# Patient Record
Sex: FEMALE | Race: WHITE | NOT HISPANIC OR LATINO | Employment: FULL TIME | URBAN - METROPOLITAN AREA
[De-identification: names, ages, dates, MRNs, and addresses within clinical notes are randomized per-mention and may not be internally consistent; named-entity substitution may affect disease eponyms.]

---

## 2021-11-21 ENCOUNTER — APPOINTMENT (OUTPATIENT)
Dept: RADIOLOGY | Facility: IMAGING CENTER | Age: 56
End: 2021-11-21
Attending: NURSE PRACTITIONER
Payer: COMMERCIAL

## 2021-11-21 ENCOUNTER — OCCUPATIONAL MEDICINE (OUTPATIENT)
Dept: URGENT CARE | Facility: CLINIC | Age: 56
End: 2021-11-21
Payer: COMMERCIAL

## 2021-11-21 VITALS
WEIGHT: 176 LBS | SYSTOLIC BLOOD PRESSURE: 110 MMHG | OXYGEN SATURATION: 97 % | TEMPERATURE: 97.3 F | RESPIRATION RATE: 16 BRPM | HEIGHT: 65 IN | HEART RATE: 69 BPM | BODY MASS INDEX: 29.32 KG/M2 | DIASTOLIC BLOOD PRESSURE: 80 MMHG

## 2021-11-21 DIAGNOSIS — S80.02XA CONTUSION OF LEFT KNEE, INITIAL ENCOUNTER: ICD-10-CM

## 2021-11-21 DIAGNOSIS — S89.92XA INJURY OF LEFT KNEE, INITIAL ENCOUNTER: ICD-10-CM

## 2021-11-21 DIAGNOSIS — S76.012A HIP STRAIN, LEFT, INITIAL ENCOUNTER: ICD-10-CM

## 2021-11-21 PROCEDURE — 99203 OFFICE O/P NEW LOW 30 MIN: CPT | Performed by: NURSE PRACTITIONER

## 2021-11-21 PROCEDURE — 73564 X-RAY EXAM KNEE 4 OR MORE: CPT | Mod: TC,LT | Performed by: NURSE PRACTITIONER

## 2021-11-21 PROCEDURE — 73501 X-RAY EXAM HIP UNI 1 VIEW: CPT | Mod: TC,LT | Performed by: NURSE PRACTITIONER

## 2021-11-21 NOTE — LETTER
[facility] [address] [place] [address]  Phone:  [phone] - Fax:  [phone]   [facility] Progress Report and Disability Certification  Date of Service: 11/21/2021   No Show:  No  Date / Time of Next Visit: 11/29/2021   Claim Information   Patient Name: Hanny Fitzpatrick  Claim Number:     Employer: [employer]  Date of Injury: 11/21/2021     Insurer / TPA: [employer]  ID / SSN:     Occupation:   Diagnosis: Diagnoses of Injury of left knee, initial encounter, Hip strain, left, initial encounter, and Contusion of left knee, initial encounter were pertinent to this visit.    Medical Information   Related to Industrial Injury? 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  States she was turning to her left when her right foot caught an air hose causing her to fall to the ground.  Patient fell directly onto her left knee and landed on her left hip.  She did put her hands out to brace her fall however has no wrist pain.  She complains of left knee pain with certain movements and palpation.  Has difficulty ambulating due to the pain.  She has taken Tylenol and Aleve with mild relief in her symptoms.  She denies any numbness or tingling in bilateral lower extremities.  She denies previous injury to the left knee and/or left hip.  She denies second job.   Objective Findings: Left knee: No gross deformities, skin without erythema, mild edema noted, exquisitely tender to palpation to the patella. +AROM with pain.  Gait antalgic.  DTRs +2.  Left hip: No gross deformities, no bony tenderness, muscular soreness noted.+AROM .  Right wrist: No gross deformities, nontender to palpation, no erythema, no edema, no ecchymosis +AROM  Left wrist: No gross deformities, nontender to palpation, no erythema,  no edema, no ecchymosis, +AROM.       Pre-Existing Condition(s):     Assessment:   Initial Visit    Status: Additional Care Required  Permanent Disability:No    Plan:      Diagnostics: X-ray    Comments:    I independently reviewed the patient's imaging and agree with the interpretation of the radiologist.      No radiographic evidence of acute traumatic injury.     If there is concern for occult fracture, cross-sectional imaging can be obtained.  No   radiographic evidence of acute traumatic injury.    Disability Information   Status: Released to Restricted Duty    From:  2021  Through: 2021 Restrictions are: Temporary   Physical Restrictions   Sitting:    Standing:  < or = to 2 hrs/day Stooping:    Bendin hrs/day   Squattin hrs/day Walkin hrs/day Climbin hrs/day Pushing:      Pulling:    Other:    Reaching Above Shoulder (L):   Reaching Above Shoulder (R):       Reaching Below Shoulder (L):    Reaching Below Shoulder (R):      Not to exceed Weight Limits   Carrying(hrs):   Weight Limit(lb): < or = to 10 pounds Lifting(hrs):   Weight  Limit(lb): < or = to 10 pounds   Comments: X-ray negative for any fracture or dislocations.  No concerns of occult fracture of the left hip.  Patient provided a left knee brace and crutches advised weightbearing as tolerated.  Encouraged to rest, ice, may take Tylenol ibuprofen as needed for pain.  Will place on work restrictions.  Follow-up in 1 week for reevaluation    Repetitive Actions   Hands: i.e. Fine Manipulations from Grasping:     Feet: i.e. Operating Foot Controls:     Driving / Operate Machinery:     Health Care Provider’s Original or Electronic Signature  Deejay aHmm A.P.R.NPalmer Health Care Provider’s Original or Electronic Signature    Robbie Dawson MD         Clinic Name / Location: [address] [place] [address] Clinic Phone Number: Dept: [phone]   Appointment Time: 6:15 Pm Visit Start Time:  7:29 PM   Check-In Time:  6:16 Pm Visit Discharge Time:     Original-Treating Physician or Chiropractor    Page 2-Insurer/TPA    Page 3-Employer    Page 4-Employee

## 2021-11-21 NOTE — LETTER
"EMPLOYEE’S CLAIM FOR COMPENSATION/ REPORT OF INITIAL TREATMENT  FORM C-4    EMPLOYEE’S CLAIM - PROVIDE ALL INFORMATION REQUESTED   First Name  Hanny Last Name  Amari Birthdate                    1965                Sex  female Claim Number (Insurer’s Use Only)    Home Address  [address] Age  56 y.o. Height  1.638 m (5' 4.5\") Weight  79.8 kg (176 lb) [facility]     [facility] Zip  [address] Telephone  [phone] (home)    Mailing Address  [address] [facility] Zip  [address] Primary Language Spoken  English    Insurer   Third-Party   [employer]   Employee's Occupation (Job Title) When Injury or Occupational Disease Occurred      Employer's Name/Company Name  [employer]  Telephone  [phone]    Office Mail Address (Number and Street)   [address]  [facility]  Zip  [address]    Date of Injury  11/21/2021               Hours Injury  4:15 PM Date Employer Notified  11/21/2021 Last Day of Work after Injury     or Occupational Disease  11/21/2021 Supervisor to Whom Injury     Reported  Doc Schofield   Address or Location of Accident (if applicable)  Work [1]   What were you doing at the time of accident? 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 (Be specific an answer in detail. Use additional sheet if necessary)  Turned to walk away, right foot caught in airhose, body landed on left knee/hip   If you believe that you have an occupational disease, when did you first have knowledge of the disability and it relationship to your employment?  n/a Witnesses to the Accident  Zak Bacon      Nature of Injury or Occupational Disease  Workers' Compensation  Part(s) of Body Injured or Affected  Lower Leg (L), Knee (L), Hip (L)    I certify that the above is " true and correct to the best of my knowledge and that I have provided this information in order to obtain the benefits of [place]  I hereby authorize any physician, chiropractor, surgeon, practitioner, or other person, any hospital, including [facility], any medical service organization, any insurance company, or other institution or organization to release to each other, any medical or other information, including benefits paid or payable, pertinent to this injury or disease, except information relative to diagnosis, treatment and/or counseling for AIDS, psychological conditions, alcohol or controlled substances, for which I must give specific authorization.  A Photostat of this authorization shall be as valid as the original.     Date   Place Employee’s Original or  *Electronic Signature   THIS REPORT MUST BE COMPLETED AND MAILED WITHIN 3 WORKING DAYS OF TREATMENT   Place  [facility]  Name of Facility  [facility]   Date  11/21/2021 Diagnosis and Description of Injury or Occupational Disease  (S89.92XA) Injury of left knee, initial encounter  (S76.012A) Hip strain, left, initial encounter  (S80.02XA) Contusion of left knee, initial encounter Is there evidence the injured employee was under the influence of alcohol and/or another controlled substance at the time of accident?  ? No ? Yes (if yes, please explain)    Hour  7:29 PM   Diagnoses of Injury of left knee, initial encounter, Hip strain, left, initial encounter, and Contusion of left knee, initial encounter were pertinent to this visit. No   Treatment  X-ray negative for any fracture or dislocations.  No concerns of occult fracture of the left hip.  Patient provided a left knee brace and crutches advised weightbearing as tolerated.  Encouraged to rest, ice, may take Tylenol ibuprofen as needed for pain.  Will  place on work restrictions.  Follow-up in 1 week for reevaluation  Have you advised the patient to remain off work five days or     more?    X-Ray Findings  Negative   ? Yes Indicate dates:   From   To      From information given by the employee, together with medical evidence, can        you directly connect this injury or occupational disease as job incurred?  Yes ? No If no, is the injured employee capable of:  ? full duty  No ? modified duty  Yes   Is additional medical care by a physician indicated?  Yes If Modified Duty, Specify any Limitations / Restrictions  Limited standing, walking, no bending, climbing, stooping, weight restrictions less than 10 pounds   Do you know of any previous injury or disease contributing to this condition or occupational disease?  ? Yes ? No (Explain if yes)                          No   Date  11/21/2021 Print Health Care Provider's   HARRIS Hardy I certify the employer’s copy of  this form was mailed on:   Address  [address] [place] [address] Insurer’s Use Only     [facility]  [address]    Provider’s Tax ID Number  [id number] Telephone  Dept: [phone]             Health Care Provider’s Original or Electronic Signature  e-RAKAN Ibarra.RPalmerNPalmer Degree (MD,DO, DC,PA-C,APRN)         * Complete and attach Release of Information (Form C-4A) when injured employee signs C-4 Form electronically  ORIGINAL - TREATING HEALTHCARE PROVIDER PAGE 2 - INSURER/TPA PAGE 3 - EMPLOYER PAGE 4 - EMPLOYEE             Form C-4 (rev.08/21)           BRIEF DESCRIPTION OF RIGHTS AND BENEFITS  (Pursuant to [place] 616C.050)    Notice of Injury or Occupational Disease (Incident Report Form C-1): If an injury or occupational disease (OD) arises out of and in the course of employment, you must provide written notice to your employer as soon as practicable, but no later than 7 days after the accident or OD. Your employer shall maintain a sufficient supply of the required  "forms.    Claim for Compensation (Form C-4): If medical treatment is sought, the form C-4 is available at the place of initial treatment. A completed \"Claim for Compensation\" (Form C-4) must be filed within 90 days after an accident or OD. The treating physician or chiropractor must, within 3 working days after treatment, complete and mail to the employer, the employer's insurer and third-party , the Claim for Compensation.    Medical Treatment: If you require medical treatment for your on-the-job injury or OD, you may be required to select a physician or chiropractor from a list provided by your workers’ compensation insurer, if it has contracted with an Organization for Managed Care (MCO) or Preferred Provider Organization (PPO) or providers of health care. If your employer has not entered into a contract with an MCO or PPO, you may select a physician or chiropractor from the Panel of Physicians and Chiropractors. Any medical costs related to your industrial injury or OD will be paid by your insurer.    Temporary Total Disability (TTD): If your doctor has certified that you are unable to work for a period of at least 5 consecutive days, or 5 cumulative days in a 20-day period, or places restrictions on you that your employer does not accommodate, you may be entitled to TTD compensation.    Temporary Partial Disability (TPD): If the wage you receive upon reemployment is less than the compensation for TTD to which you are entitled, the insurer may be required to pay you TPD compensation to make up the difference. TPD can only be paid for a maximum of 24 months.    Permanent Partial Disability (PPD): When your medical condition is stable and there is an indication of a PPD as a result of your injury or OD, within 30 days, your insurer must arrange for an evaluation by a rating physician or chiropractor to determine the degree of your PPD. The amount of your PPD award depends on the date of injury, the " results of the PPD evaluation, your age and wage.    Permanent Total Disability (PTD): If you are medically certified by a treating physician or chiropractor as permanently and totally disabled and have been granted a PTD status by your insurer, you are entitled to receive monthly benefits not to exceed 66 2/3% of your average monthly wage. The amount of your PTD payments is subject to reduction if you previously received a lump-sum PPD award.    Vocational Rehabilitation Services: You may be eligible for vocational rehabilitation services if you are unable to return to the job due to a permanent physical impairment or permanent restrictions as a result of your injury or occupational disease.    Transportation and Per Reyna Reimbursement: You may be eligible for travel expenses and per reyna associated with medical treatment.    Reopening: You may be able to reopen your claim if your condition worsens after claim closure.     Appeal Process: If you disagree with a written determination issued by the insurer or the insurer does not respond to your request, you may appeal to the Department of Administration, , by following the instructions contained in your determination letter. You must appeal the determination within 70 days from the date of the determination letter at [address], [address], [place], [place] [address], or [address], [place] [address]. If you disagree with the  decision, you may appeal to the Department of Administration, . You must file your appeal within 30 days from the date of the  decision letter at [address], [address], [place], [place] [address], or [address], [place], [place] [address]. If you disagree with a decision of an , you may file a petition for judicial review with the District Court. You must do so within 30 days of the Appeal Officer’s  decision. You may be represented by an  at your own expense or you may contact the [facility] for possible representation.    [place]  for Injured Workers (NAIW): If you disagree with a  decision, you may request that NAIW represent you without charge at an  Hearing. For information regarding denial of benefits, you may contact the [facility] at: [address], [address], [place] [address], [phone], or [address], [address], [place] [address], [phone]    To File a Complaint with the Division: If you wish to file a complaint with the  of the Division of Industrial Relations (DIR),  please contact the Workers’ Compensation Section, [address], [address], [place], [place] [address], telephone [phone], or [address], [place], [place] [address], telephone [phone].    For assistance with Workers’ Compensation Issues: You may contact the [facility] Office for Consumer Health Assistance, [address], [place], [place] [address], Toll Free [phone], Web site: http://[facility].[place].gov/Programs/ELIE E-mail: [email or web address]              __________________________________________________________________                                    _________________            Employee Name / Signature                                                                                                                            Date                                                                                                                                                                                                                              D-2 (rev. 10/20)

## 2021-11-22 RX ORDER — LEVOTHYROXINE SODIUM 0.12 MG/1
125 TABLET ORAL
COMMUNITY
Start: 2021-10-14

## 2021-11-22 RX ORDER — ESTRADIOL 1 MG/1
1 TABLET ORAL
COMMUNITY
Start: 2021-10-14

## 2021-11-22 RX ORDER — LEVOTHYROXINE SODIUM 112 UG/1
112 TABLET ORAL
COMMUNITY
Start: 2021-09-17

## 2021-11-22 RX ORDER — PROGESTERONE 100 MG/1
CAPSULE ORAL
COMMUNITY
Start: 2021-10-16

## 2021-11-22 ASSESSMENT — ENCOUNTER SYMPTOMS
VOMITING: 0
EYE PAIN: 0
NAUSEA: 0
CHILLS: 0
FEVER: 0
SORE THROAT: 0
MYALGIAS: 0
DIZZINESS: 0
SHORTNESS OF BREATH: 0
FALLS: 1

## 2021-11-22 NOTE — PROGRESS NOTES
"Subjective:   Hanny Fitzpatrick  is a 56 y.o. 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  States she was turning to her left when her right foot caught an air hose causing her to fall to the ground.  Patient fell directly onto her left knee and landed on her left hip.  She did put her hands out to brace her fall however has no wrist pain.  She complains of left knee pain with certain movements and palpation.  Has difficulty ambulating due to the pain.  She has taken Tylenol and Aleve with mild relief in her symptoms.  She denies any numbness or tingling in bilateral lower extremities.  She denies previous injury to the left knee and/or left hip.  She denies second job.   HPI  Review of Systems   Constitutional: Negative for chills and fever.   HENT: Negative for sore throat.    Eyes: Negative for pain.   Respiratory: Negative for shortness of breath.    Cardiovascular: Negative for chest pain.   Gastrointestinal: Negative for nausea and vomiting.   Genitourinary: Negative for hematuria.   Musculoskeletal: Positive for falls and joint pain. Negative for myalgias.   Skin: Negative for rash.   Neurological: Negative for dizziness.     Allergies   Allergen Reactions   • Dilaudid [Hydromorphone Hcl]      seizure   • Aspirin    • Barium      Can't remember   • Morphine       Objective:   /80   Pulse 69   Temp 36.3 °C (97.3 °F)   Resp 16   Ht 1.638 m (5' 4.5\")   Wt 79.8 kg (176 lb)   SpO2 97%   BMI 29.74 kg/m²   Physical Exam  Constitutional:       Appearance: Normal appearance. She is not ill-appearing or toxic-appearing.   HENT:      Head: Normocephalic.      Right Ear: External ear normal.      Left Ear: External ear normal.      Nose: Nose normal.      Mouth/Throat:      Lips: " Pink.      Mouth: Mucous membranes are moist.   Eyes:      General: Lids are normal.         Right eye: No discharge.         Left eye: No discharge.   Pulmonary:      Effort: Pulmonary effort is normal. No accessory muscle usage or respiratory distress.   Musculoskeletal:      Right wrist: Normal.      Left wrist: Normal.      Cervical back: Full passive range of motion without pain.      Left hip: Tenderness present. No deformity or crepitus. Normal range of motion. Normal strength.      Left knee: Bony tenderness present. No deformity or crepitus. Decreased range of motion. Tenderness present over the patellar tendon. No medial joint line tenderness.   Skin:     Coloration: Skin is not pale.   Neurological:      Mental Status: She is alert and oriented to person, place, and time.   Psychiatric:         Mood and Affect: Mood normal.         Thought Content: Thought content normal.       Left knee: No gross deformities, skin without erythema, mild edema noted, exquisitely tender to palpation to the patella. +AROM with pain.  Gait antalgic.  DTRs +2.  Left hip: No gross deformities, no bony tenderness, muscular soreness noted.+AROM .  Right wrist: No gross deformities, nontender to palpation, no erythema, no edema, no ecchymosis +AROM  Left wrist: No gross deformities, nontender to palpation, no erythema, no edema, no ecchymosis, +AROM.       Assessment/Plan:     1. Injury of left knee, initial encounter  DX-KNEE COMPLETE 4+ LEFT   2. Hip strain, left, initial encounter  DX-HIP-UNILATERAL-WITH PELVIS-1 VIEW LEFT   3. Contusion of left knee, initial encounter         I independently reviewed the patient's imaging and agree with the interpretation of the radiologist.      No radiographic evidence of acute traumatic injury.     If there is concern for occult fracture, cross-sectional imaging can be obtained.  No radiographic evidence of acute traumatic injury.  X-ray negative for any fracture or dislocations.  No  concerns of occult fracture of the left hip.  Patient provided a left knee brace and crutches advised weightbearing as tolerated.  Encouraged to rest, ice, may take Tylenol ibuprofen as needed for pain.  Will place on work restrictions.  Follow-up in 1 week for reevaluation  Differential diagnosis, natural history, supportive care, and indications for immediate follow-up discussed.

## 2021-11-29 ENCOUNTER — OCCUPATIONAL MEDICINE (OUTPATIENT)
Dept: URGENT CARE | Facility: CLINIC | Age: 56
End: 2021-11-29
Payer: COMMERCIAL

## 2021-11-29 VITALS
RESPIRATION RATE: 14 BRPM | SYSTOLIC BLOOD PRESSURE: 102 MMHG | OXYGEN SATURATION: 96 % | DIASTOLIC BLOOD PRESSURE: 64 MMHG | TEMPERATURE: 98.2 F | HEIGHT: 65 IN | WEIGHT: 176 LBS | HEART RATE: 89 BPM | BODY MASS INDEX: 29.32 KG/M2

## 2021-11-29 DIAGNOSIS — S89.92XD INJURY OF LEFT KNEE, SUBSEQUENT ENCOUNTER: ICD-10-CM

## 2021-11-29 DIAGNOSIS — S76.012S HIP STRAIN, LEFT, SEQUELA: ICD-10-CM

## 2021-11-29 DIAGNOSIS — S80.02XD CONTUSION OF LEFT KNEE, SUBSEQUENT ENCOUNTER: ICD-10-CM

## 2021-11-29 PROCEDURE — 99213 OFFICE O/P EST LOW 20 MIN: CPT | Performed by: FAMILY MEDICINE

## 2021-11-29 NOTE — PROGRESS NOTES
"  Subjective:     56 y.o. 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  Patient fell directly onto her left knee and landed on her left hip.  She did put her hands out to brace her fall however has no wrist pain.     Visit #2 today: XRAY of left knee and hip at first visit and showed no acute processes. She was placed in a left knee brace and given restrictions. She was unable to accommodate the restrictions given to her so she has not yet been back to work. Since her first visit the edema has significantly improved, pain has improved but persists.Pain is primarily in her left hip and seems to radiate down, below the knee, it is intermittent, it comes with certain movements, it's described as burning. Currently rated 5/10. She has been using ice and Tylenol - both of which have been helping with the pain. Unfortunately she is noticing weakness to her left lower extremity, specifically at her hip. No loss of bowel or bladder function.    PMH:   No pertinent past medical history to this problem  MEDS:  Medications were reviewed in EMR  ALLERGIES:  Allergies were reviewed in EMR  SOCHX:  Works as a   FH:   No pertinent family history to this problem     Objective:     /64   Pulse 89   Temp 36.8 °C (98.2 °F) (Temporal)   Resp 14   Ht 1.638 m (5' 4.5\")   Wt 79.8 kg (176 lb)   LMP 03/29/2011   SpO2 96%   BMI 29.74 kg/m²     Left knee: No discolorations or deformities noted to in inspection.  Patella is freely movable and does not cause pain.  Anterior drawer and posterior drawer negative.  Antalgic gait noted.   Left hip: No discolorations or deformities noted to inspection.  She is tender to palpation over her greater trochanteric region.       Assessment/Plan:     1. Injury of left knee, subsequent encounter  - Referral to " Occupational Medicine    2. Contusion of left knee, subsequent encounter  - Referral to Occupational Medicine    3. Hip strain, left, sequela  - Referral to Occupational Medicine    • Released to Restricted Duty FROM 11/29/2021 TO 12/2/2021     -Continue with restrictions of maximum 10 pounds of lifting, less than 2 hours of standing, no hours of bending, squatting, walking while at work   -Brace as needed for symptomatic relief  -Heat, Tylenol, ibuprofen as needed for pain    Differential diagnosis, natural history, supportive care, and indications for immediate follow-up discussed.

## 2021-11-29 NOTE — LETTER
[facility]  [address] [facility] [address] [place] [address]  Phone:  [phone] - Fax:  [phone]   [facility] Progress Report and Disability Certification  Date of Service: 11/29/2021   No Show:  No  Date / Time of Next Visit: 12/2/2021 with occ med    Claim Information   Patient Name: Hanny Fitzpatrick  Claim Number:     Employer: [employer]  Date of Injury: 11/21/2021     Insurer / TPA: [employer]  ID / SSN:     Occupation:   Diagnosis: Diagnoses of Injury of left knee, subsequent encounter, Contusion of left knee, subsequent encounter, and Hip strain, left, sequela were pertinent to this visit.    Medical Information   Related to Industrial Injury? Yes    Subjective Complaints:  DOI: 11/21/21  IVON: She was turning to inspect a part at work when her right foot caught an air hose causing her to fall to the ground.  Patient fell directly onto her left knee and landed on her left hip.  She did put her hands out to brace her fall however has no wrist pain.     Visit #2 today: XRAY of left knee and hip at first visit and showed no acute processes. She was placed in a left knee brace and given restrictions. She was unable to accommodate the restrictions given to her so she has not yet been back to work. Since her first visit the edema has significantly improved, pain has improved but persists.Pain is primarily in her left hip and seems to radiate down, below the knee, it is intermittent, it comes with certain movements, it's described as burning. Currently rated 5/10. She has been using ice and Tylenol - both of which have been helping with the pain. Unfortunately she is noticing weakness to her left lower extremity, specifically at her hip. No loss of bowel or bladder function.   Objective Findings: Left knee: No discolorations or deformities noted to in inspection.  Patella is freely movable and does not cause pain.  Anterior drawer and posterior  drawer negative.  Antalgic gait noted.   Left hip: No discolorations or deformities noted to inspection.  She is tender to palpation over her greater trochanteric region.      Pre-Existing Condition(s):     Assessment:   Condition Improved    Status: Discharged / Care Transfer  Permanent Disability:No    Plan:      Diagnostics:      Comments:  -Continue with restrictions of maximum 10 pounds of lifting, less than 2 hours of standing, no hours of bending, squatting, walking while at work   -Brace as needed for symptomatic relief  -Heat, Tylenol, ibuprofen as needed for pain    Disability Information   Status: Released to Restricted Duty    From:  2021  Through: 2021 Restrictions are:     Physical Restrictions   Sitting:    Standing:  < or = to 2 hrs/day Stooping:    Bendin hrs/day   Squattin hrs/day Walkin hrs/day Climbing:    Pushing:      Pulling:    Other:    Reaching Above Shoulder (L):   Reaching Above Shoulder (R):       Reaching Below Shoulder (L):    Reaching Below Shoulder (R):      Not to exceed Weight Limits   Carrying(hrs):   Weight Limit(lb): < or = to 10 pounds Lifting(hrs):   Weight  Limit(lb): < or = to 10 pounds   Comments:      Repetitive Actions   Hands: i.e. Fine Manipulations from Grasping:     Feet: i.e. Operating Foot Controls:     Driving / Operate Machinery:     Health Care Provider’s Original or Electronic Signature  Cee Najera M.D. Health Care Provider’s Original or Electronic Signature    Robbie Dawson MD         Clinic Name / Location: [address] [address] Clinic Phone Number: Dept: [phone]   Appointment Time: 9:20 Am Visit Start Time: 9:28 AM   Check-In Time:  9:04 Am Visit Discharge Time: 10:08 AM   Original-Treating Physician or Chiropractor    Page 2-Insurer/TPA    Page 3-Employer    Page 4-Employee

## 2021-12-01 ENCOUNTER — OCCUPATIONAL MEDICINE (OUTPATIENT)
Dept: OCCUPATIONAL MEDICINE | Facility: CLINIC | Age: 56
End: 2021-12-01
Payer: COMMERCIAL

## 2021-12-01 VITALS
OXYGEN SATURATION: 95 % | TEMPERATURE: 97.5 F | HEIGHT: 65 IN | SYSTOLIC BLOOD PRESSURE: 122 MMHG | DIASTOLIC BLOOD PRESSURE: 82 MMHG | BODY MASS INDEX: 28.32 KG/M2 | WEIGHT: 170 LBS | HEART RATE: 86 BPM

## 2021-12-01 DIAGNOSIS — S80.02XD CONTUSION OF LEFT KNEE, SUBSEQUENT ENCOUNTER: ICD-10-CM

## 2021-12-01 DIAGNOSIS — S76.012D HIP STRAIN, LEFT, SUBSEQUENT ENCOUNTER: ICD-10-CM

## 2021-12-01 DIAGNOSIS — M25.9 DISORDER OF LEFT SACROILIAC JOINT: ICD-10-CM

## 2021-12-01 PROCEDURE — 99203 OFFICE O/P NEW LOW 30 MIN: CPT | Performed by: PREVENTIVE MEDICINE

## 2021-12-01 NOTE — PROGRESS NOTES
"Subjective:     Hanny Fitzpatrick is a 56 y.o. 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  IVON: She was turning to inspect a part at work when her right foot caught an air hose causing her to fall to the ground she landed directly on her left knee and strained her left hip/SI area.  She states that the hip has been improving somewhat pain is less but feels like it is still off a little bit.  She states she has tried chiropractic treatment in the past and has good success and would like to consider some chiropractic treatments for the hip.  She states the left knee as well since the most painful.  Pain is burning type sensation near the patella that goes down the tibia slightly.  Patient states that she takes occasional Tylenol for relief.  She states that with some improvement in her symptoms she feels comfortable doing most of her job just feels like she may need breaks to sit down.  Denies prior left knee or hip injuries.    ROS: All systems were reviewed on intake form, form was reviewed and signed. See scanned documents in media. Pertinent positives and negatives included in HPI.    PMH: No pertinent past medical history to this problem  MEDS: Medications were reviewed in Epic  ALLERGIES:   Allergies   Allergen Reactions   • Dilaudid [Hydromorphone Hcl]      seizure   • Aspirin    • Barium      Can't remember   • Morphine      SOCHX: Works as a  at [employer]  FH: No pertinent family history to this problem       Objective:     /82   Pulse 86   Temp 36.4 °C (97.5 °F)   Ht 1.638 m (5' 4.5\")   Wt 77.1 kg (170 lb)   LMP 03/29/2011   SpO2 95%   BMI 28.73 kg/m²     Constitutional: Patient is in no acute distress. Appears well-developed and well-nourished.   HENT: Normocephalic and atraumatic. EOM are normal. No scleral icterus.   Cardiovascular: Normal rate.    Pulmonary/Chest: Effort " normal. No respiratory distress.   Neurological: Patient is alert and oriented to person, place, and time.   Skin: Skin is warm and dry.   Psychiatric: Normal mood and affect. Behavior is normal.     Left knee: No gross deformity.  Tenderness over the inferior patella and proximal tibia.  No joint line tenderness.  Full range of motion.  Anterior/posterior drawer test negative.  No laxity varus or valgus stress.  Left hip/SI joint: No gross deformity.  Tenderness over the trochanteric head and SI joint.  Full range of motion.      Assessment/Plan:       1. Contusion of left knee, subsequent encounter  - diclofenac sodium (VOLTAREN) 1 % Gel; Apply 4 g topically 3 times a day as needed.  Dispense: 100 g; Refill: 0    2. Hip strain, left, subsequent encounter  - Referral to Chiropractic  - diclofenac sodium (VOLTAREN) 1 % Gel; Apply 4 g topically 3 times a day as needed.  Dispense: 100 g; Refill: 0    3. Disorder of left sacroiliac joint  - Referral to Chiropractic  - diclofenac sodium (VOLTAREN) 1 % Gel; Apply 4 g topically 3 times a day as needed.  Dispense: 100 g; Refill: 0    Released to Restricted Duty FROM 12/1/2021 TO 12/15/2021  Allow seated breaks every 2 hours as needed  Referral to chiropractic treatments for the left hip  Prescribe diclofenac 1% gel to apply to knee 3 times daily as needed  Okay to use heat/ice  Restricted duty  Follow-up 2 weeks    Differential diagnosis, natural history, supportive care, and indications for immediate follow-up discussed.    Approximately 25 minutes were spent in reviewing notes, preparing for visit, obtaining history, exam and evaluation, patient counseling/education and post visit documentation/orders.

## 2021-12-01 NOTE — LETTER
[address],   [address] [place] [address]  Phone:  [phone] - Fax:  [phone]   Occupational Health [facility] Progress Report and Disability Certification  Date of Service: 12/1/2021   No Show:  No  Date / Time of Next Visit: 12/15/2021 @ 8:15am   Claim Information   Patient Name: Hanny Fitzpatrick  Claim Number:     Employer: [employer]  Date of Injury: 11/21/2021     Insurer / TPA: [employer]  ID / SSN:     Occupation:   Diagnosis: Diagnoses of Contusion of left knee, subsequent encounter, Hip strain, left, subsequent encounter, and Disorder of left sacroiliac joint were pertinent to this visit.    Medical Information   Related to Industrial Injury? 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  IVON: She was turning to inspect a part at work when her right foot caught an air hose causing her to fall to the ground she landed directly on her left knee and strained her left hip/SI area.  She states that the hip has been improving somewhat pain is less but feels like it is still off a little bit.  She states she has tried chiropractic treatment in the past and has good success and would like to consider some chiropractic treatments for the hip.  She states the left knee as well since the most painful.  Pain is burning type sensation near the patella that goes down the tibia slightly.  Patient states that she takes occasional Tylenol for relief.  She states that with some improvement in her symptoms she feels comfortable doing most of her job just feels like she may need breaks to sit down.  Denies prior left knee or hip injuries.   Objective Findings: Left knee: No gross deformity.  Tenderness over the inferior patella and proximal tibia.  No joint line tenderness.  Full range of motion.  Anterior/posterior drawer test negative.  No laxity varus or valgus stress.  Left hip/SI joint:  No gross deformity.  Tenderness over the trochanteric head and SI joint.  Full range of motion.     Pre-Existing Condition(s):     Assessment:   Condition Same    Status: Additional Care Required  Permanent Disability:No    Plan:      Diagnostics:      Comments:  Referral to chiropractic treatments for the left hip  Prescribe diclofenac 1% gel to apply to knee 3 times daily as needed  Okay to use heat/ice  Restricted duty  Follow-up 2 weeks    Disability Information   Status: Released to Restricted Duty    From:  12/1/2021  Through: 12/15/2021 Restrictions are: Temporary   Physical Restrictions   Sitting:    Standing:    Stooping:    Bending:      Squatting:    Walking:    Climbing:    Pushing:      Pulling:    Other:    Reaching Above Shoulder (L):   Reaching Above Shoulder (R):       Reaching Below Shoulder (L):    Reaching Below Shoulder (R):      Not to exceed Weight Limits   Carrying(hrs):   Weight Limit(lb):   Lifting(hrs):   Weight  Limit(lb):     Comments: Allow seated breaks every 2 hours as needed    Repetitive Actions   Hands: i.e. Fine Manipulations from Grasping:     Feet: i.e. Operating Foot Controls:     Driving / Operate Machinery:     Health Care Provider’s Original or Electronic Signature  Jag Downey D.O. Health Care Provider’s Original or Electronic Signature    Robbie Dawson MD         Clinic Name / Location: [address],   [address] [place] [address] Clinic Phone Number: Dept: [phone]   Appointment Time: 2:30 Pm Visit Start Time: 2:21 PM   Check-In Time:  2:14 Pm Visit Discharge Time:  3:18pm   Original-Treating Physician or Chiropractor    Page 2-Insurer/TPA    Page 3-Employer    Page 4-Employee

## 2021-12-15 ENCOUNTER — OCCUPATIONAL MEDICINE (OUTPATIENT)
Dept: OCCUPATIONAL MEDICINE | Facility: CLINIC | Age: 56
End: 2021-12-15
Payer: COMMERCIAL

## 2021-12-15 VITALS
WEIGHT: 170 LBS | RESPIRATION RATE: 16 BRPM | SYSTOLIC BLOOD PRESSURE: 116 MMHG | HEART RATE: 70 BPM | BODY MASS INDEX: 29.02 KG/M2 | HEIGHT: 64 IN | OXYGEN SATURATION: 96 % | DIASTOLIC BLOOD PRESSURE: 70 MMHG

## 2021-12-15 DIAGNOSIS — S80.02XD CONTUSION OF LEFT KNEE, SUBSEQUENT ENCOUNTER: ICD-10-CM

## 2021-12-15 DIAGNOSIS — M25.9 DISORDER OF LEFT SACROILIAC JOINT: ICD-10-CM

## 2021-12-15 DIAGNOSIS — S76.012D HIP STRAIN, LEFT, SUBSEQUENT ENCOUNTER: ICD-10-CM

## 2021-12-15 PROCEDURE — 99213 OFFICE O/P EST LOW 20 MIN: CPT | Performed by: PREVENTIVE MEDICINE

## 2021-12-15 NOTE — PROGRESS NOTES
"Subjective:     Hanny Fitzpatrick is a 56 y.o. 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  IVON: She was turning to inspect a part at work when her right foot caught an air hose causing her to fall to the ground she landed directly on her left knee and strained her left hip/SI area.  Patient states overall left knee has been improving significantly.  She states she still has some minimal discomfort especially after being on her feet all day at work but overall much improved.  She states her left hip continues to be problematic and causes pain into the back.  She still awaiting approval for chiropractor treatments.  But she does state that on Monday she received a letter from the work comp insurance stating her claim had been accepted.    ROS         Objective:     /70   Pulse 70   Resp 16   Ht 1.626 m (5' 4\")   Wt 77.1 kg (170 lb)   LMP 03/29/2011   SpO2 96%   BMI 29.18 kg/m²     Constitutional: Patient is in no acute distress. Appears well-developed and well-nourished.   Cardiovascular: Normal rate.    Pulmonary/Chest: Effort normal. No respiratory distress.   Neurological: Patient is alert and oriented to person, place, and time.   Skin: Skin is warm and dry.   Psychiatric: Normal mood and affect. Behavior is normal.     Left knee: No gross deformity.  Full range of motion.  Normal gait.  Left hip/SI joint: No gross deformity.  Normal gait.    Assessment/Plan:       1. Contusion of left knee, subsequent encounter    2. Hip strain, left, subsequent encounter    3. Disorder of left sacroiliac joint    Released to Restricted Duty FROM 12/15/2021 TO 1/12/2022  Allow seated breaks every 2 hours as needed    Referral to chiropractic treatments for the left hip, approval pending  Continue diclofenac gel as needed  Okay to use heat/ice  Restricted duty  Follow-up 3 weeks    Differential diagnosis, " natural history, supportive care, and indications for immediate follow-up discussed.    Approximately 25 minutes was spent in preparing for visit, obtaining history, exam and evaluation, patient counseling/education and post visit documentation/orders.

## 2021-12-15 NOTE — LETTER
[address],   [address] [place] [address]  Phone:  [phone] - Fax:  [phone]   Occupational Health [facility] Progress Report and Disability Certification  Date of Service: 12/15/2021   No Show:  No  Date / Time of Next Visit: 1/12/2022 @ 8am   Claim Information   Patient Name: Hanny Fitzpatrick  Claim Number:     Employer: [employer]  Date of Injury: 11/21/2021     Insurer / TPA: [employer]  ID / SSN:     Occupation:   Diagnosis: Diagnoses of Contusion of left knee, subsequent encounter, Hip strain, left, subsequent encounter, and Disorder of left sacroiliac joint were pertinent to this visit.    Medical Information   Related to Industrial Injury? 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  IVON: She was turning to inspect a part at work when her right foot caught an air hose causing her to fall to the ground she landed directly on her left knee and strained her left hip/SI area.  Patient states overall left knee has been improving significantly.  She states she still has some minimal discomfort especially after being on her feet all day at work but overall much improved.  She states her left hip continues to be problematic and causes pain into the back.  She still awaiting approval for chiropractor treatments.  But she does state that on Monday she received a letter from the work comp insurance stating her claim had been accepted.   Objective Findings: Left knee: No gross deformity.  Full range of motion.  Normal gait.  Left hip/SI joint: No gross deformity.  Normal gait.   Pre-Existing Condition(s):     Assessment:   Condition Improved    Status: Additional Care Required  Permanent Disability:No    Plan:      Diagnostics:      Comments:  Referral to chiropractic treatments for the left hip, approval pending  Continue diclofenac gel as needed  Okay to use heat/ice  Restricted  duty  Follow-up 3 weeks    Disability Information   Status: Released to Restricted Duty    From:  12/15/2021  Through: 1/12/2022 Restrictions are: Temporary   Physical Restrictions   Sitting:    Standing:    Stooping:    Bending:      Squatting:    Walking:    Climbing:    Pushing:      Pulling:    Other:    Reaching Above Shoulder (L):   Reaching Above Shoulder (R):       Reaching Below Shoulder (L):    Reaching Below Shoulder (R):      Not to exceed Weight Limits   Carrying(hrs):   Weight Limit(lb):   Lifting(hrs):   Weight  Limit(lb):     Comments: Allow seated breaks every 2 hours as needed    Repetitive Actions   Hands: i.e. Fine Manipulations from Grasping:     Feet: i.e. Operating Foot Controls:     Driving / Operate Machinery:     Health Care Provider’s Original or Electronic Signature  Jag Downey D.O. Health Care Provider’s Original or Electronic Signature    Robbie Dawson MD         Clinic Name / Location: [address],   [address] [place] [address] Clinic Phone Number: Dept: [phone]   Appointment Time: 8:15 Am Visit Start Time: 8:04 AM   Check-In Time:  7:58 Am Visit Discharge Time:  8:24am   Original-Treating Physician or Chiropractor    Page 2-Insurer/TPA    Page 3-Employer    Page 4-Employee

## 2021-12-21 DIAGNOSIS — M25.9 DISORDER OF LEFT SACROILIAC JOINT: ICD-10-CM

## 2021-12-21 DIAGNOSIS — S76.012D HIP STRAIN, LEFT, SUBSEQUENT ENCOUNTER: ICD-10-CM

## 2021-12-21 DIAGNOSIS — S80.02XD CONTUSION OF LEFT KNEE, SUBSEQUENT ENCOUNTER: ICD-10-CM

## 2022-11-07 ENCOUNTER — OFFICE VISIT (OUTPATIENT)
Dept: URGENT CARE | Facility: PHYSICIAN GROUP | Age: 57
End: 2022-11-07
Payer: COMMERCIAL

## 2022-11-07 VITALS
SYSTOLIC BLOOD PRESSURE: 130 MMHG | RESPIRATION RATE: 18 BRPM | HEART RATE: 60 BPM | DIASTOLIC BLOOD PRESSURE: 76 MMHG | WEIGHT: 160 LBS | HEIGHT: 64 IN | OXYGEN SATURATION: 97 % | TEMPERATURE: 98 F | BODY MASS INDEX: 27.31 KG/M2

## 2022-11-07 DIAGNOSIS — B96.89 BACTERIAL SINUSITIS: ICD-10-CM

## 2022-11-07 DIAGNOSIS — J32.9 BACTERIAL SINUSITIS: ICD-10-CM

## 2022-11-07 PROCEDURE — 99213 OFFICE O/P EST LOW 20 MIN: CPT

## 2022-11-07 RX ORDER — AMOXICILLIN AND CLAVULANATE POTASSIUM 875; 125 MG/1; MG/1
1 TABLET, FILM COATED ORAL 2 TIMES DAILY
Qty: 14 TABLET | Refills: 0 | Status: SHIPPED | OUTPATIENT
Start: 2022-11-07 | End: 2022-11-14

## 2022-11-07 RX ORDER — FLUTICASONE PROPIONATE 50 MCG
1 SPRAY, SUSPENSION (ML) NASAL DAILY
Qty: 16 G | Refills: 0 | Status: SHIPPED | OUTPATIENT
Start: 2022-11-07

## 2022-11-07 RX ORDER — CETIRIZINE HYDROCHLORIDE 10 MG/1
10 TABLET ORAL DAILY
Qty: 30 TABLET | Refills: 0 | Status: SHIPPED | OUTPATIENT
Start: 2022-11-07

## 2022-11-08 NOTE — PROGRESS NOTES
Subjective:   Hanny Fitzpatrick is a 57 y.o. female who presents for Cough (X 10 days, phlegm, ear pressure mainly in right ear, back teeth hurt to close, Right eye was swollen this am, coughing through the night, symptoms worsening not getting better )      HPI:    Patient presents to urgent care with complaints of dry, nonproductive cough, right ear pressure, sinus headaches, dizziness, congestion, runny nose, increased teeth sensitivity/pain. Onset was approximately 10 days ago. She reports a few days with improvement, then her symptoms worsened again. Mild improvement with netti pot, saline rinses, warm showers. Denies fever, nausea, vomiting, diarrhea. Reports chills, night sweats, malaise.     ROS As above in HPI    Medications:    Current Outpatient Medications on File Prior to Visit   Medication Sig Dispense Refill    diclofenac sodium (VOLTAREN) 1 % Gel 4 GRAM TOPICAL 3 TIMES A DAY AS NEEDED 100 g 0    progesterone (PROMETRIUM) 100 MG Cap TAKE ONE CAPSULE BY MOUTH ONCE DAILY AT BEDTIME      levothyroxine (SYNTHROID) 125 MCG Tab Take 1 Tablet by mouth every morning on an empty stomach.      levothyroxine (SYNTHROID) 112 MCG Tab Take 1 Tablet by mouth every day.      estradiol (ESTRACE) 1 MG Tab Take 1 Tablet by mouth every day.      levothyroxine (SYNTHROID) 100 MCG Tab Take 1 Tablet by mouth every morning on an empty stomach.       No current facility-administered medications on file prior to visit.        Allergies:   Dilaudid [hydromorphone hcl], Aspirin, Barium, and Morphine    Problem List:   There is no problem list on file for this patient.       Surgical History:  Past Surgical History:   Procedure Laterality Date    VENTRAL HERNIA REPAIR  3/31/2011    Performed by INGA BELLE at SURGERY [facility] ORS    EXPLORATORY LAPAROTOMY  11/13/2010    Performed by INGA BELLE at SURGERY [facility]    BOWEL RESECTION  11/13/2010    Performed by INGA BELLE at SURGERY [facility] ORS     "EXPLORATORY LAPAROTOMY  6/26/2010    Performed by INGA BELLE at SURGERY [facility] ORS    BOWEL RESECTION  6/2010    ZECHARIAH BY LAPAROSCOPY  4/10/2009    Performed by JAMISON LEON at SURGERY SAME DAY [facility] ORS    APPENDECTOMY      TONSILLECTOMY         Past Social Hx:   Social History     Tobacco Use    Smoking status: Never    Smokeless tobacco: Never    Tobacco comments:     april 2009   Vaping Use    Vaping Use: Never used   Substance Use Topics    Alcohol use: Yes     Comment: socially    Drug use: No          Problem list, medications, and allergies reviewed by myself today in Epic.     Objective:     /76 (BP Location: Left arm, Patient Position: Sitting, BP Cuff Size: Adult)   Pulse 60   Temp 36.7 °C (98 °F) (Temporal)   Resp 18   Ht 1.626 m (5' 4\")   Wt 72.6 kg (160 lb)   LMP 03/29/2011   SpO2 97%   BMI 27.46 kg/m²     Physical Exam  Vitals and nursing note reviewed.   Constitutional:       General: She is not in acute distress.     Appearance: Normal appearance. She is not ill-appearing or diaphoretic.   HENT:      Head: Normocephalic and atraumatic.   Eyes:      Conjunctiva/sclera: Conjunctivae normal.   Cardiovascular:      Rate and Rhythm: Normal rate and regular rhythm.      Heart sounds: Normal heart sounds.   Pulmonary:      Effort: Pulmonary effort is normal.      Breath sounds: Normal breath sounds. No stridor. No wheezing, rhonchi or rales.   Chest:      Chest wall: No tenderness.   Abdominal:      General: Abdomen is flat. Bowel sounds are normal. There is no distension.      Palpations: Abdomen is soft. There is no mass.      Tenderness: There is no abdominal tenderness. There is no right CVA tenderness, left CVA tenderness, guarding or rebound.      Hernia: No hernia is present.   Musculoskeletal:         General: Normal range of motion.   Skin:     General: Skin is warm and dry.      Capillary Refill: Capillary refill takes less than 2 seconds.      Findings: No rash. "   Neurological:      Mental Status: She is alert and oriented to person, place, and time.       Assessment/Plan:       Diagnosis and associated orders:   1. Bacterial sinusitis  - amoxicillin-clavulanate (AUGMENTIN) 875-125 MG Tab; Take 1 Tablet by mouth 2 times a day for 7 days.  Dispense: 14 Tablet; Refill: 0  - fluticasone (FLONASE) 50 MCG/ACT nasal spray; Administer 1 Spray into affected nostril(S) every day.  Dispense: 16 g; Refill: 0  - cetirizine (ZYRTEC ALLERGY) 10 MG Tab; Take 1 Tablet by mouth every day.  Dispense: 30 Tablet; Refill: 0      Comments/MDM:     Supportive measures encouraged  Return to urgent care prn if new or worsening sx or if there is no improvement in condition prn.         Pt is clinically stable at today's acute urgent care visit.  No acute distress noted. Appropriate for outpatient management at this time.       Discussed DDx, management options (risks,benefits, and alternatives to planned treatment), natural progression and supportive care.  Expressed understanding and the treatment plan was agreed upon. Questions were encouraged and answered     Please note that this dictation was created using voice recognition software. I have made a reasonable attempt to correct obvious errors, but I expect that there are errors of grammar and possibly content that I did not discover before finalizing the note.    This note was electronically signed by Shana Taylor, PAM

## 2023-05-17 ENCOUNTER — HOSPITAL ENCOUNTER (OUTPATIENT)
Dept: RADIOLOGY | Facility: MEDICAL CENTER | Age: 58
End: 2023-05-17
Payer: COMMERCIAL

## 2023-05-17 ENCOUNTER — OFFICE VISIT (OUTPATIENT)
Dept: URGENT CARE | Facility: PHYSICIAN GROUP | Age: 58
End: 2023-05-17
Payer: COMMERCIAL

## 2023-05-17 VITALS
BODY MASS INDEX: 27.49 KG/M2 | WEIGHT: 161 LBS | HEIGHT: 64 IN | OXYGEN SATURATION: 98 % | SYSTOLIC BLOOD PRESSURE: 112 MMHG | DIASTOLIC BLOOD PRESSURE: 68 MMHG | HEART RATE: 68 BPM | TEMPERATURE: 97.6 F

## 2023-05-17 DIAGNOSIS — M79.671 RIGHT FOOT PAIN: ICD-10-CM

## 2023-05-17 PROCEDURE — 3078F DIAST BP <80 MM HG: CPT

## 2023-05-17 PROCEDURE — 99213 OFFICE O/P EST LOW 20 MIN: CPT

## 2023-05-17 PROCEDURE — 3074F SYST BP LT 130 MM HG: CPT

## 2023-05-17 PROCEDURE — 73630 X-RAY EXAM OF FOOT: CPT | Mod: RT

## 2023-05-17 ASSESSMENT — ENCOUNTER SYMPTOMS: TINGLING: 1

## 2023-05-17 NOTE — PROGRESS NOTES
Subjective:   Hanny Fitzpatrick is a 57 y.o. female who presents for Foot Swelling (R side swollen, not able to put weight fully. )      HPI:This is a 58 yo female who presents today for right foot pain. This is a new problem. Patient reports stepping off a stair two weeks ago and experiencing severe pain to the plantar aspect of her right foot. She reports persistent pain and swelling to her right foot. She reports pain is 6/10 that is exacerbated with weightbearing.  She reports taking anti-inflammatories, Tylenol, and has used tape for compression without any improvement in her symptoms.  She reports burning sensation to first through third toes of her right foot.  No pre-existing injuries to her foot.    Review of Systems   Musculoskeletal:         + Right foot pain   Neurological:  Positive for tingling.   All other systems reviewed and are negative.      Medications:    Current Outpatient Medications on File Prior to Visit   Medication Sig Dispense Refill    fluticasone (FLONASE) 50 MCG/ACT nasal spray Administer 1 Spray into affected nostril(S) every day. 16 g 0    cetirizine (ZYRTEC ALLERGY) 10 MG Tab Take 1 Tablet by mouth every day. 30 Tablet 0    diclofenac sodium (VOLTAREN) 1 % Gel 4 GRAM TOPICAL 3 TIMES A DAY AS NEEDED 100 g 0    progesterone (PROMETRIUM) 100 MG Cap TAKE ONE CAPSULE BY MOUTH ONCE DAILY AT BEDTIME      levothyroxine (SYNTHROID) 125 MCG Tab Take 1 Tablet by mouth every morning on an empty stomach.      levothyroxine (SYNTHROID) 112 MCG Tab Take 1 Tablet by mouth every day.      estradiol (ESTRACE) 1 MG Tab Take 1 Tablet by mouth every day.      levothyroxine (SYNTHROID) 100 MCG Tab Take 1 Tablet by mouth every morning on an empty stomach.       No current facility-administered medications on file prior to visit.        Allergies:   Dilaudid [hydromorphone hcl], Aspirin, Barium, and Morphine    Problem List:   There is no problem list on file for this patient.       Surgical  "History:  Past Surgical History:   Procedure Laterality Date    VENTRAL HERNIA REPAIR  3/31/2011    Performed by INGA BELLE at SURGERY [facility] ORS    EXPLORATORY LAPAROTOMY  11/13/2010    Performed by INGA BELLE at SURGERY [facility] ORS    BOWEL RESECTION  11/13/2010    Performed by INGA BELLE at SURGERY [facility] ORS    EXPLORATORY LAPAROTOMY  6/26/2010    Performed by INGA BELLE at SURGERY [facility] ORS    BOWEL RESECTION  6/2010    ZECHARIAH BY LAPAROSCOPY  4/10/2009    Performed by JAMISON LEON at SURGERY SAME DAY [facility] ORS    APPENDECTOMY      TONSILLECTOMY         Past Social Hx:   Social History     Tobacco Use    Smoking status: Never    Smokeless tobacco: Never    Tobacco comments:     april 2009   Vaping Use    Vaping Use: Never used   Substance Use Topics    Alcohol use: Yes     Comment: socially    Drug use: No          Problem list, medications, and allergies reviewed by myself today in Epic.     Objective:     /68   Pulse 68   Temp 36.4 °C (97.6 °F) (Temporal)   Ht 1.626 m (5' 4\")   Wt 73 kg (161 lb)   LMP 03/29/2011   SpO2 98%   BMI 27.64 kg/m²     Physical Exam  Vitals and nursing note reviewed.   Constitutional:       General: She is not in acute distress.     Appearance: Normal appearance. She is normal weight. She is not ill-appearing, toxic-appearing or diaphoretic.   HENT:      Head: Normocephalic and atraumatic.   Musculoskeletal:        Feet:    Feet:      Comments: Right foot: No obvious abnormality, no erythema, no swelling, no surface trauma.  Subjective tenderness as diagrammed above.  Full range of motion.  Skin:     General: Skin is warm and dry.      Capillary Refill: Capillary refill takes less than 2 seconds.   Neurological:      Mental Status: She is alert.      Gait: Gait normal.         Assessment/Plan:     Diagnosis and associated orders:   1. Right foot pain  DX-FOOT-COMPLETE 3+ RIGHT    Referral to Orthopedics      Dx right " foot:  FINDINGS:  Bone mineralization is normal.  There is no evidence of fracture or dislocation.  There is mild to moderate joint space narrowing and periarticular sclerosis and marginal spurring at the first MTP joint. No bone erosions are identified.  There   is hallux valgus deformity of the first digit.     IMPRESSION:     1.  No evidence of fracture or dislocation.     2.  Hallux valgus deformity and arthritis are noted at the first MTP joint.      Comments/MDM:   Pt is clinically stable at today's acute urgent care visit.  No acute distress noted. Appropriate for outpatient management at this time.     Acute problem.  Dx right foot negative for acute fracture or dislocation.  X-ray shows hallux valgus deformity and arthritis are noted at the first MTP joint.  These results were discussed with patient.  Patient is requesting referral to specialist today.  Patient will be referred to [facility].  Advised patient to continue orthotics, alternate Tylenol ibuprofen as needed for pain, and wear well fitted shoes.  She is to return turn for any new or worsening signs or symptoms, and follow-up with orthopedics.  Patient is agreeable this plan of care verbalizes good understanding.           Discussed DDx, management options (risks,benefits, and alternatives to planned treatment), natural progression and supportive care.  Expressed understanding and the treatment plan was agreed upon. Questions were encouraged and answered   Return to urgent care prn if new or worsening sx or if there is no improvement in condition prn.    Educated in Red flags and indications to immediately call 911 or present to the Emergency Department.   Advised the patient to follow-up with the primary care physician for recheck, reevaluation, and consideration of further management.    I personally reviewed prior external notes and test results pertinent to today's visit.  I have independently reviewed and interpreted all  diagnostics ordered during this urgent care acute visit.         Please note that this dictation was created using voice recognition software. I have made a reasonable attempt to correct obvious errors, but I expect that there are errors of grammar and possibly content that I did not discover before finalizing the note.    This note was electronically signed by SHREYA Hastings

## 2023-06-22 ENCOUNTER — OFFICE VISIT (OUTPATIENT)
Dept: URGENT CARE | Facility: PHYSICIAN GROUP | Age: 58
End: 2023-06-22
Payer: COMMERCIAL

## 2023-06-22 VITALS
RESPIRATION RATE: 18 BRPM | SYSTOLIC BLOOD PRESSURE: 112 MMHG | OXYGEN SATURATION: 98 % | TEMPERATURE: 98.2 F | DIASTOLIC BLOOD PRESSURE: 70 MMHG | WEIGHT: 160 LBS | HEART RATE: 71 BPM | HEIGHT: 64 IN | BODY MASS INDEX: 27.31 KG/M2

## 2023-06-22 DIAGNOSIS — J32.9 BACTERIAL SINUSITIS: ICD-10-CM

## 2023-06-22 DIAGNOSIS — B96.89 BACTERIAL SINUSITIS: ICD-10-CM

## 2023-06-22 PROCEDURE — 99213 OFFICE O/P EST LOW 20 MIN: CPT | Performed by: FAMILY MEDICINE

## 2023-06-22 PROCEDURE — 3078F DIAST BP <80 MM HG: CPT | Performed by: FAMILY MEDICINE

## 2023-06-22 PROCEDURE — 3074F SYST BP LT 130 MM HG: CPT | Performed by: FAMILY MEDICINE

## 2023-06-22 RX ORDER — DOXYCYCLINE HYCLATE 100 MG
100 TABLET ORAL EVERY 12 HOURS
Qty: 14 TABLET | Refills: 0 | Status: SHIPPED | OUTPATIENT
Start: 2023-06-22 | End: 2023-06-29

## 2023-06-22 RX ORDER — DEXAMETHASONE 6 MG/1
6 TABLET ORAL DAILY
Qty: 3 TABLET | Refills: 0 | Status: SHIPPED | OUTPATIENT
Start: 2023-06-22 | End: 2023-06-25

## 2023-06-22 ASSESSMENT — ENCOUNTER SYMPTOMS: SINUS PAIN: 1

## 2023-06-22 NOTE — PROGRESS NOTES
Subjective     Hanny Fitzpatrick is a 57 y.o. female who presents with Sinus Problem (Patient states its going on for 14 days now. Voice sometimes goes away and spits up greenish yellow now. )      - This is a pleasant and nontoxic appearing 57 y.o. person who has come to the walk-in clinic today for:    #1) 2wks w/ ongoing sinu congestion and colorful dc and some hoarse voice. 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    Ovarian cyst     right side    VENTRAL HERNIA         PSH:  Past Surgical History:   Procedure Laterality Date    VENTRAL HERNIA REPAIR  3/31/2011    Performed by INGA BELLE at SURGERY [facility] ORS    EXPLORATORY LAPAROTOMY  11/13/2010    Performed by INGA BELLE at SURGERY [facility] ORS    BOWEL RESECTION  11/13/2010    Performed by INGA BELLE at SURGERY [facility] ORS    EXPLORATORY LAPAROTOMY  6/26/2010    Performed by INGA BELLE at SURGERY [facility] ORS    BOWEL RESECTION  6/2010    ZECHARIAH BY LAPAROSCOPY  4/10/2009    Performed by JAMISON LEON at SURGERY SAME DAY [facility] ORS    APPENDECTOMY      TONSILLECTOMY         MEDS:    Current Outpatient Medications:     dexamethasone (DECADRON) 6 MG Tab, Take 1 Tablet by mouth every day for 3 days., Disp: 3 Tablet, Rfl: 0    doxycycline (VIBRAMYCIN) 100 MG Tab, Take 1 Tablet by mouth every 12 hours for 7 days., Disp: 14 Tablet, Rfl: 0    fluticasone (FLONASE) 50 MCG/ACT nasal spray, Administer 1 Spray into affected nostril(S) every day., Disp: 16 g, Rfl: 0    cetirizine (ZYRTEC ALLERGY) 10 MG Tab, Take 1 Tablet by mouth every day., Disp: 30 Tablet, Rfl: 0    diclofenac sodium (VOLTAREN) 1 % Gel, 4 GRAM TOPICAL 3 TIMES A DAY AS  "NEEDED, Disp: 100 g, Rfl: 0    progesterone (PROMETRIUM) 100 MG Cap, TAKE ONE CAPSULE BY MOUTH ONCE DAILY AT BEDTIME, Disp: , Rfl:     levothyroxine (SYNTHROID) 125 MCG Tab, Take 1 Tablet by mouth every morning on an empty stomach., Disp: , Rfl:     levothyroxine (SYNTHROID) 112 MCG Tab, Take 1 Tablet by mouth every day., Disp: , Rfl:     estradiol (ESTRACE) 1 MG Tab, Take 1 Tablet by mouth every day., Disp: , Rfl:     levothyroxine (SYNTHROID) 100 MCG Tab, Take 1 Tablet by mouth every morning on an empty stomach., Disp: , Rfl:     ** I have documented what I find to be significant in regards to past medical, social, family and surgical history  in my HPI or under PMH/PSH/FH review section, otherwise it is noncontributory **         HPI    Review of Systems   HENT:  Positive for congestion and sinus pain.    All other systems reviewed and are negative.             Objective     /70   Pulse 71   Temp 36.8 °C (98.2 °F) (Temporal)   Resp 18   Ht 1.626 m (5' 4\")   Wt 72.6 kg (160 lb)   LMP 03/29/2011   SpO2 98%   BMI 27.46 kg/m²      Physical Exam  Vitals and nursing note reviewed.   Constitutional:       General: She is not in acute distress.     Appearance: Normal appearance. She is well-developed.   HENT:      Head: Normocephalic.      Nose: Congestion and rhinorrhea present.      Mouth/Throat:      Mouth: Mucous membranes are moist.      Pharynx: Oropharynx is clear.   Cardiovascular:      Heart sounds: Normal heart sounds. No murmur heard.  Pulmonary:      Effort: Pulmonary effort is normal. No respiratory distress.   Neurological:      Mental Status: She is alert.      Motor: No abnormal muscle tone.   Psychiatric:         Mood and Affect: Mood normal.         Behavior: Behavior normal.           Assessment & Plan     1. Bacterial sinusitis  dexamethasone (DECADRON) 6 MG Tab    doxycycline (VIBRAMYCIN) 100 MG Tab          - Dx, plan & d/c instructions discussed   - OTC Flonase and Sudafed      Follow " up with your regular primary care providers office for a recheck on today's visit. ER if not improving in 2-3 days or if feeling/getting worse.     Patient left in stable condition     Pertinent prior office visit notes in Epic have been reviewed by me today on day of this visit.

## 2024-08-06 ENCOUNTER — OFFICE VISIT (OUTPATIENT)
Dept: URGENT CARE | Facility: PHYSICIAN GROUP | Age: 59
End: 2024-08-06
Payer: COMMERCIAL

## 2024-08-06 VITALS
HEART RATE: 75 BPM | RESPIRATION RATE: 18 BRPM | SYSTOLIC BLOOD PRESSURE: 114 MMHG | WEIGHT: 167.4 LBS | HEIGHT: 66 IN | OXYGEN SATURATION: 98 % | DIASTOLIC BLOOD PRESSURE: 62 MMHG | BODY MASS INDEX: 26.9 KG/M2 | TEMPERATURE: 98.1 F

## 2024-08-06 DIAGNOSIS — R05.1 ACUTE COUGH: ICD-10-CM

## 2024-08-06 PROCEDURE — 3074F SYST BP LT 130 MM HG: CPT | Performed by: REGISTERED NURSE

## 2024-08-06 PROCEDURE — 3078F DIAST BP <80 MM HG: CPT | Performed by: REGISTERED NURSE

## 2024-08-06 PROCEDURE — 99214 OFFICE O/P EST MOD 30 MIN: CPT | Performed by: REGISTERED NURSE

## 2024-08-06 RX ORDER — BACILLUS COAGULANS/INULIN 1B-250 MG
CAPSULE ORAL
COMMUNITY

## 2024-08-06 RX ORDER — PERPHENAZINE 4 MG
TABLET ORAL
COMMUNITY

## 2024-08-06 RX ORDER — DOXYCYCLINE HYCLATE 100 MG
100 TABLET ORAL 2 TIMES DAILY
Qty: 14 TABLET | Refills: 0 | Status: SHIPPED | OUTPATIENT
Start: 2024-08-06 | End: 2024-08-13

## 2024-08-06 RX ORDER — BENZONATATE 100 MG/1
100 CAPSULE ORAL 3 TIMES DAILY PRN
Qty: 30 CAPSULE | Refills: 0 | Status: SHIPPED | OUTPATIENT
Start: 2024-08-06 | End: 2024-08-16

## 2024-08-06 RX ORDER — CHOLECALCIFEROL (VITAMIN D3) 25 MCG
1 TABLET ORAL
COMMUNITY

## 2024-08-06 RX ORDER — FOLIC ACID 1 MG/1
TABLET ORAL
COMMUNITY

## 2024-08-06 ASSESSMENT — ENCOUNTER SYMPTOMS
SHORTNESS OF BREATH: 0
HEMOPTYSIS: 0
NECK PAIN: 0
FEVER: 0
ORTHOPNEA: 0
VOMITING: 0
ABDOMINAL PAIN: 0

## 2024-08-06 NOTE — PROGRESS NOTES
Subjective:   Hanny Fitzpatrick is a 59 y.o. female who presents for Laryngitis (Lost voice x6 days/Throat and chest pain x4 days/Cough x1 day)      HPI  Lost voice 6 days ago, also sore throat, runny nose, headache, and cough. Cough is non productive. No chronic heart or lung issues. Voice is improving. No known sick exposures. Using zyrtec and ibuprofen. Increased fluids.  Immunizations current.  Tolerating p.o.    Denies difficulty opening mouth, muffled voice, drooling, decreased ROM neck    Review of Systems   Constitutional:  Negative for fever.   Respiratory:  Negative for hemoptysis and shortness of breath.    Cardiovascular:  Negative for chest pain, orthopnea and leg swelling.   Gastrointestinal:  Negative for abdominal pain and vomiting.   Genitourinary:  Negative for dysuria.   Musculoskeletal:  Negative for neck pain.   Skin:  Negative for rash.       Allergies   Allergen Reactions    Dilaudid [Hydromorphone Hcl]      seizure    Aspirin     Barium      Can't remember    Morphine     Other Misc Unspecified     All opiates; triggers seizures in pt       There are no problems to display for this patient.      Current Outpatient Medications Ordered in Epic   Medication Sig Dispense Refill    Collagen-Vitamin C-Biotin (COLLAGEN 1500/C) 500-50-0.8 MG Cap Collagen      folic acid (FOLVITE) 1 MG Tab Folic Acid      Ferrous Gluconate (IRON 27 PO) Iron      Magnesium Gluconate (MAGNESIUM 27 PO) Magnesium      Omega-3 Fatty Acids (OMEGA 3 500 PO) Omega 3      Bacillus Coagulans-Inulin (PROBIOTIC) 1-250 BILLION-MG Cap Probiotic      ascorbic acid (VITAMIN C) 1000 MG tablet Take 1 Tablet by mouth every day.      vitamin D (CHOLECALCIFEROL) 1000 Unit Tab Take 1 Tablet by mouth every day.      doxycycline (VIBRAMYCIN) 100 MG Tab Take 1 Tablet by mouth 2 times a day for 7 days. 14 Tablet 0    benzonatate (TESSALON) 100 MG Cap Take 1 Capsule by mouth 3 times a day as needed for Cough for up to 10 days. 30 Capsule 0     cetirizine (ZYRTEC ALLERGY) 10 MG Tab Take 1 Tablet by mouth every day. 30 Tablet 0    progesterone (PROMETRIUM) 100 MG Cap TAKE ONE CAPSULE BY MOUTH ONCE DAILY AT BEDTIME      levothyroxine (SYNTHROID) 112 MCG Tab Take 1 Tablet by mouth every day.      estradiol (ESTRACE) 1 MG Tab Take 1 Tablet by mouth every day.      fluticasone (FLONASE) 50 MCG/ACT nasal spray Administer 1 Spray into affected nostril(S) every day. (Patient not taking: Reported on 2024) 16 g 0    diclofenac sodium (VOLTAREN) 1 % Gel 4 GRAM TOPICAL 3 TIMES A DAY AS NEEDED (Patient not taking: Reported on 2024) 100 g 0    levothyroxine (SYNTHROID) 125 MCG Tab Take 1 Tablet by mouth every morning on an empty stomach. (Patient not taking: Reported on 2024)      levothyroxine (SYNTHROID) 100 MCG Tab Take 1 Tablet by mouth every morning on an empty stomach. (Patient not taking: Reported on 2024)       No current [facility]-ordered facility-administered medications on file.       Past Surgical History:   Procedure Laterality Date    VENTRAL HERNIA REPAIR  3/31/2011    Performed by INGA BELLE at SURGERY [facility] ORS    EXPLORATORY LAPAROTOMY  2010    Performed by INGA BELLE at SURGERY [facility] ORS    BOWEL RESECTION  2010    Performed by INGA BELLE at SURGERY [facility] ORS    EXPLORATORY LAPAROTOMY  2010    Performed by INGA BELLE at SURGERY [facility] ORS    BOWEL RESECTION  2010    ZECHARIAH BY LAPAROSCOPY  4/10/2009    Performed by JAMISON LEON at SURGERY SAME DAY [facility] ORS    APPENDECTOMY      TONSILLECTOMY         Social History     Tobacco Use    Smoking status: Former     Current packs/day: 0.00     Types: Cigarettes     Quit date:      Years since quittin.6    Smokeless tobacco: Never    Tobacco comments:     2009   Vaping Use    Vaping status: Never Used   Substance Use Topics    Alcohol use: Not Currently    Drug use: No       family history is not on file.     Problem  "list, medications, and allergies reviewed by myself today in Epic.     Objective:   /62 (BP Location: Right arm, Patient Position: Sitting, BP Cuff Size: Adult)   Pulse 75   Temp 36.7 °C (98.1 °F) (Temporal)   Resp 18   Ht 1.682 m (5' 6.22\")   Wt 75.9 kg (167 lb 6.4 oz)   LMP 03/29/2011   SpO2 98%   BMI 26.84 kg/m²     Physical Exam  Vitals and nursing note reviewed.   Constitutional:       General: She is not in acute distress.     Appearance: Normal appearance. She is well-developed. She is ill-appearing. She is not toxic-appearing or diaphoretic.   HENT:      Head: Normocephalic and atraumatic.      Right Ear: Tympanic membrane, ear canal and external ear normal.      Left Ear: Tympanic membrane, ear canal and external ear normal.      Nose: Congestion present. No rhinorrhea.      Mouth/Throat:      Mouth: Mucous membranes are moist.      Pharynx: No oropharyngeal exudate or posterior oropharyngeal erythema.      Comments: Clear speech, managing oral secretions, no signs of airway compromise or abscess.  Eyes:      General:         Right eye: No discharge.         Left eye: No discharge.      Conjunctiva/sclera: Conjunctivae normal.   Cardiovascular:      Rate and Rhythm: Normal rate and regular rhythm.      Pulses: Normal pulses.      Heart sounds: Normal heart sounds.   Pulmonary:      Effort: Pulmonary effort is normal. No respiratory distress.      Breath sounds: Normal breath sounds. No wheezing, rhonchi or rales.      Comments: Harsh congested cough throughout exam  Musculoskeletal:      Cervical back: Normal range of motion and neck supple.      Right lower leg: No edema.      Left lower leg: No edema.   Lymphadenopathy:      Cervical: No cervical adenopathy.   Skin:     General: Skin is warm and dry.   Neurological:      General: No focal deficit present.      Mental Status: She is alert and oriented to person, place, and time. Mental status is at baseline.   Psychiatric:         Mood and " Affect: Mood normal.         Behavior: Behavior normal.         Thought Content: Thought content normal.         Judgment: Judgment normal.         Assessment/Plan:     I personally reviewed prior external notes and test results pertinent to today's visit as well as additional imaging and testing completed in clinic today.    I introduced myself as Augie Landry a Nurse Practitioner.    1. Acute cough  doxycycline (VIBRAMYCIN) 100 MG Tab    benzonatate (TESSALON) 100 MG Cap          Very pleasant 59-year-old presenting with almost 1 week of what started as cold-like symptoms and now a worsening cough that causes her chest to hurt when coughing.  No chronic heart or lung issues.  Thankfully her vitals were reassuring.  She does appear ill but nontoxic, talking in full sentences without distress, no signs of airway obstruction or abscess, no adventitious lung sounds.  Cough is harsh moist at this time.  We did discuss likely viral etiology however given the progression of worsening cough will provide contingent doxycycline for bacterial lung infection if symptoms are worsening.  Currently would like to start the benzonatate as well as DayQuil or NyQuil.  Pulmonary toilet.  Increase fluids.  Medication discussed included indication for use and the potential benefits and side effects. The Patient was encouraged to monitor symptoms closely, and we reviewed the signs and symptoms that require a higher level of care through the emergency department. Patient verbalized understanding.    Please note that this dictation was created using voice recognition software. I have made every reasonable attempt to correct obvious errors, but I expect that there are errors of grammar and possibly content that I did not discover before finalizing the note.    This note was electronically signed by HARRIS Rosales

## 2025-04-25 ENCOUNTER — HOSPITAL ENCOUNTER (OUTPATIENT)
Dept: LAB | Facility: MEDICAL CENTER | Age: 60
End: 2025-04-25
Attending: INTERNAL MEDICINE
Payer: COMMERCIAL

## 2025-04-25 LAB
BASOPHILS # BLD AUTO: 0.6 % (ref 0–1.8)
BASOPHILS # BLD: 0.04 K/UL (ref 0–0.12)
EOSINOPHIL # BLD AUTO: 0.14 K/UL (ref 0–0.51)
EOSINOPHIL NFR BLD: 2.1 % (ref 0–6.9)
ERYTHROCYTE [DISTWIDTH] IN BLOOD BY AUTOMATED COUNT: 47.8 FL (ref 35.9–50)
HCT VFR BLD AUTO: 44 % (ref 37–47)
HGB BLD-MCNC: 14.2 G/DL (ref 12–16)
IMM GRANULOCYTES # BLD AUTO: 0.01 K/UL (ref 0–0.11)
IMM GRANULOCYTES NFR BLD AUTO: 0.2 % (ref 0–0.9)
LYMPHOCYTES # BLD AUTO: 2.27 K/UL (ref 1–4.8)
LYMPHOCYTES NFR BLD: 34.4 % (ref 22–41)
MCH RBC QN AUTO: 32.3 PG (ref 27–33)
MCHC RBC AUTO-ENTMCNC: 32.3 G/DL (ref 32.2–35.5)
MCV RBC AUTO: 100.2 FL (ref 81.4–97.8)
MONOCYTES # BLD AUTO: 0.58 K/UL (ref 0–0.85)
MONOCYTES NFR BLD AUTO: 8.8 % (ref 0–13.4)
NEUTROPHILS # BLD AUTO: 3.55 K/UL (ref 1.82–7.42)
NEUTROPHILS NFR BLD: 53.9 % (ref 44–72)
NRBC # BLD AUTO: 0 K/UL
NRBC BLD-RTO: 0 /100 WBC (ref 0–0.2)
PLATELET # BLD AUTO: 309 K/UL (ref 164–446)
PMV BLD AUTO: 11.2 FL (ref 9–12.9)
RBC # BLD AUTO: 4.39 M/UL (ref 4.2–5.4)
WBC # BLD AUTO: 6.6 K/UL (ref 4.8–10.8)

## 2025-04-25 PROCEDURE — 84270 ASSAY OF SEX HORMONE GLOBUL: CPT

## 2025-04-25 PROCEDURE — 82670 ASSAY OF TOTAL ESTRADIOL: CPT

## 2025-04-25 PROCEDURE — 85025 COMPLETE CBC W/AUTO DIFF WBC: CPT

## 2025-04-25 PROCEDURE — 82306 VITAMIN D 25 HYDROXY: CPT

## 2025-04-25 PROCEDURE — 84439 ASSAY OF FREE THYROXINE: CPT

## 2025-04-25 PROCEDURE — 84403 ASSAY OF TOTAL TESTOSTERONE: CPT

## 2025-04-25 PROCEDURE — 82784 ASSAY IGA/IGD/IGG/IGM EACH: CPT

## 2025-04-25 PROCEDURE — 80053 COMPREHEN METABOLIC PANEL: CPT

## 2025-04-25 PROCEDURE — 84443 ASSAY THYROID STIM HORMONE: CPT

## 2025-04-25 PROCEDURE — 86364 TISS TRNSGLTMNASE EA IG CLAS: CPT

## 2025-04-25 PROCEDURE — 84481 FREE ASSAY (FT-3): CPT

## 2025-04-25 PROCEDURE — 36415 COLL VENOUS BLD VENIPUNCTURE: CPT

## 2025-04-26 LAB
25(OH)D3 SERPL-MCNC: 66 NG/ML (ref 30–100)
ALBUMIN SERPL BCP-MCNC: 4 G/DL (ref 3.2–4.9)
ALBUMIN/GLOB SERPL: 1.2 G/DL
ALP SERPL-CCNC: 76 U/L (ref 30–99)
ALT SERPL-CCNC: 38 U/L (ref 2–50)
ANION GAP SERPL CALC-SCNC: 9 MMOL/L (ref 7–16)
AST SERPL-CCNC: 34 U/L (ref 12–45)
BILIRUB SERPL-MCNC: 0.3 MG/DL (ref 0.1–1.5)
BUN SERPL-MCNC: 20 MG/DL (ref 8–22)
CALCIUM ALBUM COR SERPL-MCNC: 9.4 MG/DL (ref 8.5–10.5)
CALCIUM SERPL-MCNC: 9.4 MG/DL (ref 8.5–10.5)
CHLORIDE SERPL-SCNC: 104 MMOL/L (ref 96–112)
CO2 SERPL-SCNC: 26 MMOL/L (ref 20–33)
CREAT SERPL-MCNC: 1.26 MG/DL (ref 0.5–1.4)
ESTRADIOL SERPL-MCNC: 90.8 PG/ML
GFR SERPLBLD CREATININE-BSD FMLA CKD-EPI: 50 ML/MIN/1.73 M 2
GLOBULIN SER CALC-MCNC: 3.4 G/DL (ref 1.9–3.5)
GLUCOSE SERPL-MCNC: 91 MG/DL (ref 65–99)
POTASSIUM SERPL-SCNC: 5.1 MMOL/L (ref 3.6–5.5)
PROT SERPL-MCNC: 7.4 G/DL (ref 6–8.2)
SODIUM SERPL-SCNC: 139 MMOL/L (ref 135–145)
T3FREE SERPL-MCNC: 2.5 PG/ML (ref 2–4.4)
T4 FREE SERPL-MCNC: 1.57 NG/DL (ref 0.93–1.7)
TSH SERPL-ACNC: 0.31 UIU/ML (ref 0.38–5.33)

## 2025-04-27 LAB — TTG IGA SER IA-ACNC: <1.02 FLU (ref 0–4.99)

## 2025-04-28 LAB
IGA SERPL-MCNC: 315 MG/DL (ref 68–408)
SHBG SERPL-SCNC: 144 NMOL/L (ref 17–125)

## 2025-05-01 LAB — TESTOST SERPL-MCNC: 45 NG/DL (ref 9–55)
